# Patient Record
Sex: FEMALE | Race: WHITE | Employment: FULL TIME | URBAN - METROPOLITAN AREA
[De-identification: names, ages, dates, MRNs, and addresses within clinical notes are randomized per-mention and may not be internally consistent; named-entity substitution may affect disease eponyms.]

---

## 2021-04-22 ENCOUNTER — APPOINTMENT (OUTPATIENT)
Dept: CT IMAGING | Age: 41
End: 2021-04-22
Payer: COMMERCIAL

## 2021-04-22 ENCOUNTER — HOSPITAL ENCOUNTER (OUTPATIENT)
Age: 41
Setting detail: OBSERVATION
Discharge: HOME | End: 2021-04-23
Attending: STUDENT IN AN ORGANIZED HEALTH CARE EDUCATION/TRAINING PROGRAM | Admitting: STUDENT IN AN ORGANIZED HEALTH CARE EDUCATION/TRAINING PROGRAM
Payer: COMMERCIAL

## 2021-04-22 DIAGNOSIS — D21.9 FIBROIDS: ICD-10-CM

## 2021-04-22 DIAGNOSIS — R11.2 NON-INTRACTABLE VOMITING WITH NAUSEA, UNSPECIFIED VOMITING TYPE: ICD-10-CM

## 2021-04-22 DIAGNOSIS — K56.600 PARTIAL SMALL BOWEL OBSTRUCTION (HCC): Primary | ICD-10-CM

## 2021-04-22 DIAGNOSIS — R10.30 LOWER ABDOMINAL PAIN: ICD-10-CM

## 2021-04-22 PROBLEM — R10.9 ABDOMINAL PAIN: Status: ACTIVE | Noted: 2021-04-22

## 2021-04-22 LAB
ALBUMIN SERPL-MCNC: 4.1 GM/DL (ref 3.4–5)
ALP BLD-CCNC: 110 IU/L (ref 40–129)
ALT SERPL-CCNC: 29 U/L (ref 10–40)
ANION GAP SERPL CALCULATED.3IONS-SCNC: 8 MMOL/L (ref 4–16)
AST SERPL-CCNC: 18 IU/L (ref 15–37)
BACTERIA: NEGATIVE /HPF
BASOPHILS ABSOLUTE: 0 K/CU MM
BASOPHILS RELATIVE PERCENT: 0.3 % (ref 0–1)
BILIRUB SERPL-MCNC: 0.4 MG/DL (ref 0–1)
BILIRUBIN URINE: NEGATIVE MG/DL
BLOOD, URINE: ABNORMAL
BUN BLDV-MCNC: 18 MG/DL (ref 6–23)
CALCIUM SERPL-MCNC: 8.9 MG/DL (ref 8.3–10.6)
CHLORIDE BLD-SCNC: 103 MMOL/L (ref 99–110)
CLARITY: ABNORMAL
CO2: 24 MMOL/L (ref 21–32)
COLOR: ABNORMAL
CREAT SERPL-MCNC: 0.9 MG/DL (ref 0.6–1.1)
DIFFERENTIAL TYPE: ABNORMAL
EOSINOPHILS ABSOLUTE: 0 K/CU MM
EOSINOPHILS RELATIVE PERCENT: 0.2 % (ref 0–3)
GFR AFRICAN AMERICAN: >60 ML/MIN/1.73M2
GFR NON-AFRICAN AMERICAN: >60 ML/MIN/1.73M2
GLUCOSE BLD-MCNC: 132 MG/DL (ref 70–99)
GLUCOSE, URINE: NEGATIVE MG/DL
HCG QUALITATIVE: NEGATIVE
HCT VFR BLD CALC: 48.5 % (ref 37–47)
HEMOGLOBIN: 15.5 GM/DL (ref 12.5–16)
IMMATURE NEUTROPHIL %: 0.3 % (ref 0–0.43)
KETONES, URINE: ABNORMAL MG/DL
LACTATE: 1.3 MMOL/L (ref 0.4–2)
LEUKOCYTE ESTERASE, URINE: NEGATIVE
LIPASE: 31 IU/L (ref 13–60)
LYMPHOCYTES ABSOLUTE: 1.4 K/CU MM
LYMPHOCYTES RELATIVE PERCENT: 10 % (ref 24–44)
MCH RBC QN AUTO: 26.3 PG (ref 27–31)
MCHC RBC AUTO-ENTMCNC: 32 % (ref 32–36)
MCV RBC AUTO: 82.2 FL (ref 78–100)
MONOCYTES ABSOLUTE: 0.6 K/CU MM
MONOCYTES RELATIVE PERCENT: 4.2 % (ref 0–4)
MUCUS: ABNORMAL HPF
NITRITE URINE, QUANTITATIVE: NEGATIVE
NUCLEATED RBC %: 0 %
PDW BLD-RTO: 14.5 % (ref 11.7–14.9)
PH, URINE: 5 (ref 5–8)
PLATELET # BLD: 251 K/CU MM (ref 140–440)
PMV BLD AUTO: 11.5 FL (ref 7.5–11.1)
POTASSIUM SERPL-SCNC: 4 MMOL/L (ref 3.5–5.1)
PROTEIN UA: 30 MG/DL
RBC # BLD: 5.9 M/CU MM (ref 4.2–5.4)
RBC URINE: 1 /HPF (ref 0–6)
SEGMENTED NEUTROPHILS ABSOLUTE COUNT: 11.9 K/CU MM
SEGMENTED NEUTROPHILS RELATIVE PERCENT: 85 % (ref 36–66)
SODIUM BLD-SCNC: 135 MMOL/L (ref 135–145)
SPECIFIC GRAVITY UA: 1.03 (ref 1–1.03)
SQUAMOUS EPITHELIAL: <1 /HPF
TOTAL IMMATURE NEUTOROPHIL: 0.04 K/CU MM
TOTAL NUCLEATED RBC: 0 K/CU MM
TOTAL PROTEIN: 7.2 GM/DL (ref 6.4–8.2)
TRICHOMONAS: ABNORMAL /HPF
UROBILINOGEN, URINE: NEGATIVE MG/DL (ref 0.2–1)
WBC # BLD: 14 K/CU MM (ref 4–10.5)
WBC UA: ABNORMAL /HPF (ref 0–5)

## 2021-04-22 PROCEDURE — 99285 EMERGENCY DEPT VISIT HI MDM: CPT

## 2021-04-22 PROCEDURE — 96375 TX/PRO/DX INJ NEW DRUG ADDON: CPT

## 2021-04-22 PROCEDURE — 94761 N-INVAS EAR/PLS OXIMETRY MLT: CPT

## 2021-04-22 PROCEDURE — 83690 ASSAY OF LIPASE: CPT

## 2021-04-22 PROCEDURE — 85025 COMPLETE CBC W/AUTO DIFF WBC: CPT

## 2021-04-22 PROCEDURE — 81001 URINALYSIS AUTO W/SCOPE: CPT

## 2021-04-22 PROCEDURE — 80053 COMPREHEN METABOLIC PANEL: CPT

## 2021-04-22 PROCEDURE — 6370000000 HC RX 637 (ALT 250 FOR IP): Performed by: NURSE PRACTITIONER

## 2021-04-22 PROCEDURE — G0378 HOSPITAL OBSERVATION PER HR: HCPCS

## 2021-04-22 PROCEDURE — 6360000002 HC RX W HCPCS: Performed by: PHYSICIAN ASSISTANT

## 2021-04-22 PROCEDURE — 36415 COLL VENOUS BLD VENIPUNCTURE: CPT

## 2021-04-22 PROCEDURE — 74176 CT ABD & PELVIS W/O CONTRAST: CPT

## 2021-04-22 PROCEDURE — 84703 CHORIONIC GONADOTROPIN ASSAY: CPT

## 2021-04-22 PROCEDURE — 96374 THER/PROPH/DIAG INJ IV PUSH: CPT

## 2021-04-22 PROCEDURE — 83605 ASSAY OF LACTIC ACID: CPT

## 2021-04-22 PROCEDURE — 2580000003 HC RX 258: Performed by: PHYSICIAN ASSISTANT

## 2021-04-22 RX ORDER — PANTOPRAZOLE SODIUM 40 MG/10ML
40 INJECTION, POWDER, LYOPHILIZED, FOR SOLUTION INTRAVENOUS DAILY
Status: DISCONTINUED | OUTPATIENT
Start: 2021-04-23 | End: 2021-04-23 | Stop reason: HOSPADM

## 2021-04-22 RX ORDER — SODIUM CHLORIDE 9 MG/ML
25 INJECTION, SOLUTION INTRAVENOUS PRN
Status: DISCONTINUED | OUTPATIENT
Start: 2021-04-22 | End: 2021-04-23 | Stop reason: HOSPADM

## 2021-04-22 RX ORDER — POLYETHYLENE GLYCOL 3350 17 G/17G
17 POWDER, FOR SOLUTION ORAL DAILY PRN
Status: DISCONTINUED | OUTPATIENT
Start: 2021-04-22 | End: 2021-04-23 | Stop reason: HOSPADM

## 2021-04-22 RX ORDER — KETOROLAC TROMETHAMINE 30 MG/ML
30 INJECTION, SOLUTION INTRAMUSCULAR; INTRAVENOUS ONCE
Status: COMPLETED | OUTPATIENT
Start: 2021-04-22 | End: 2021-04-22

## 2021-04-22 RX ORDER — LISINOPRIL 20 MG/1
20 TABLET ORAL DAILY
Status: DISCONTINUED | OUTPATIENT
Start: 2021-04-23 | End: 2021-04-23 | Stop reason: HOSPADM

## 2021-04-22 RX ORDER — ACETAMINOPHEN 325 MG/1
650 TABLET ORAL EVERY 6 HOURS PRN
Status: DISCONTINUED | OUTPATIENT
Start: 2021-04-22 | End: 2021-04-23 | Stop reason: HOSPADM

## 2021-04-22 RX ORDER — ONDANSETRON 2 MG/ML
4 INJECTION INTRAMUSCULAR; INTRAVENOUS EVERY 6 HOURS PRN
Status: DISCONTINUED | OUTPATIENT
Start: 2021-04-22 | End: 2021-04-23 | Stop reason: HOSPADM

## 2021-04-22 RX ORDER — KETOROLAC TROMETHAMINE 30 MG/ML
15 INJECTION, SOLUTION INTRAMUSCULAR; INTRAVENOUS EVERY 6 HOURS PRN
Status: DISCONTINUED | OUTPATIENT
Start: 2021-04-22 | End: 2021-04-23 | Stop reason: HOSPADM

## 2021-04-22 RX ORDER — NORETHINDRONE ACETATE AND ETHINYL ESTRADIOL 1MG-20(21)
1 KIT ORAL DAILY
COMMUNITY

## 2021-04-22 RX ORDER — 0.9 % SODIUM CHLORIDE 0.9 %
1000 INTRAVENOUS SOLUTION INTRAVENOUS ONCE
Status: COMPLETED | OUTPATIENT
Start: 2021-04-22 | End: 2021-04-22

## 2021-04-22 RX ORDER — SODIUM CHLORIDE 0.9 % (FLUSH) 0.9 %
10 SYRINGE (ML) INJECTION PRN
Status: DISCONTINUED | OUTPATIENT
Start: 2021-04-22 | End: 2021-04-23 | Stop reason: HOSPADM

## 2021-04-22 RX ORDER — CITALOPRAM 40 MG/1
40 TABLET ORAL EVERY EVENING
Status: DISCONTINUED | OUTPATIENT
Start: 2021-04-23 | End: 2021-04-23 | Stop reason: HOSPADM

## 2021-04-22 RX ORDER — NORETHINDRONE ACETATE AND ETHINYL ESTRADIOL 1MG-20(21)
1 KIT ORAL DAILY
Status: DISCONTINUED | OUTPATIENT
Start: 2021-04-23 | End: 2021-04-23

## 2021-04-22 RX ORDER — ONDANSETRON 2 MG/ML
4 INJECTION INTRAMUSCULAR; INTRAVENOUS EVERY 30 MIN PRN
Status: DISCONTINUED | OUTPATIENT
Start: 2021-04-22 | End: 2021-04-22

## 2021-04-22 RX ORDER — MORPHINE SULFATE 4 MG/ML
4 INJECTION, SOLUTION INTRAMUSCULAR; INTRAVENOUS EVERY 4 HOURS PRN
Status: DISCONTINUED | OUTPATIENT
Start: 2021-04-22 | End: 2021-04-23 | Stop reason: HOSPADM

## 2021-04-22 RX ORDER — SODIUM CHLORIDE 0.9 % (FLUSH) 0.9 %
5-40 SYRINGE (ML) INJECTION EVERY 12 HOURS SCHEDULED
Status: DISCONTINUED | OUTPATIENT
Start: 2021-04-22 | End: 2021-04-23 | Stop reason: HOSPADM

## 2021-04-22 RX ORDER — PROMETHAZINE HYDROCHLORIDE 25 MG/1
12.5 TABLET ORAL EVERY 6 HOURS PRN
Status: DISCONTINUED | OUTPATIENT
Start: 2021-04-22 | End: 2021-04-23 | Stop reason: HOSPADM

## 2021-04-22 RX ORDER — MORPHINE SULFATE 4 MG/ML
4 INJECTION, SOLUTION INTRAMUSCULAR; INTRAVENOUS EVERY 30 MIN PRN
Status: DISCONTINUED | OUTPATIENT
Start: 2021-04-22 | End: 2021-04-22

## 2021-04-22 RX ORDER — SODIUM CHLORIDE 9 MG/ML
INJECTION, SOLUTION INTRAVENOUS CONTINUOUS
Status: DISCONTINUED | OUTPATIENT
Start: 2021-04-22 | End: 2021-04-23 | Stop reason: HOSPADM

## 2021-04-22 RX ORDER — LISINOPRIL 20 MG/1
20 TABLET ORAL DAILY
Status: ON HOLD | COMMUNITY
End: 2021-04-23 | Stop reason: SDUPTHER

## 2021-04-22 RX ORDER — ACETAMINOPHEN 650 MG/1
650 SUPPOSITORY RECTAL EVERY 6 HOURS PRN
Status: DISCONTINUED | OUTPATIENT
Start: 2021-04-22 | End: 2021-04-23 | Stop reason: HOSPADM

## 2021-04-22 RX ORDER — CITALOPRAM 40 MG/1
40 TABLET ORAL EVERY EVENING
COMMUNITY

## 2021-04-22 RX ADMIN — MORPHINE SULFATE 4 MG: 4 INJECTION, SOLUTION INTRAMUSCULAR; INTRAVENOUS at 14:13

## 2021-04-22 RX ADMIN — SODIUM CHLORIDE 1000 ML: 9 INJECTION, SOLUTION INTRAVENOUS at 14:13

## 2021-04-22 RX ADMIN — CITALOPRAM 40 MG: 40 TABLET, FILM COATED ORAL at 23:48

## 2021-04-22 RX ADMIN — SODIUM CHLORIDE: 9 INJECTION, SOLUTION INTRAVENOUS at 23:04

## 2021-04-22 RX ADMIN — ONDANSETRON 4 MG: 2 INJECTION INTRAMUSCULAR; INTRAVENOUS at 14:13

## 2021-04-22 RX ADMIN — KETOROLAC TROMETHAMINE 30 MG: 30 INJECTION, SOLUTION INTRAMUSCULAR; INTRAVENOUS at 16:04

## 2021-04-22 ASSESSMENT — PAIN SCALES - GENERAL
PAINLEVEL_OUTOF10: 0
PAINLEVEL_OUTOF10: 8

## 2021-04-22 ASSESSMENT — PAIN DESCRIPTION - ORIENTATION: ORIENTATION: LOWER

## 2021-04-22 NOTE — H&P
History and Physical      Name:  Toya Severin /Age/Sex: 1980  (39 y.o. female)   MRN & CSN:  [id number] & [id number] Admission Date/Time: 2021 12:25 PM   Location:  ED16/ED-16 PCP: No primary care provider on file. Discussed patient with Dr. Haroon Hernandez and Plan:     Toya Severin is a 39 y.o.  female  who presents with abdominal pain    - Abdominal pain  CT ab/pel: parastomal hernia with ? Partial SBO  ? +/- 2/2 uterine fibroid pain- scheduled for hysterectomy/hernia @[facility]  GEN SURG @ [facility], Dr Lj Xie, consulted by ED: non-surgical, obs overnight  Hx of ulcerative colitis with LLQ ostomy- ; still with good output  Abdominal pain improved in ED  NPO except sips/meds, IVF, pain control  Serial abdominal exams q 4 hours  Holding abx; lactic pending       Chronic  - Uterine fibroids hx- scheduled for hysterectomy 2021 @ [place]  - Depression- cont celexa  - HTN- cont lisinopril    Discussed patient with ER physician     Diet NPO    DVT Prophylaxis [] Lovenox, []  Heparin, [x] SCDs, [] Ambulation   GI Prophylaxis [x] PPI,  [] H2 Blocker,  [] Carafate,  [] Diet/Tube Feeds   Code Status Full   Disposition Patient requires continued admission due to    MDM [] Low, [x] Moderate,[]  High  Patient's risk as above due to      [] One or more chronic illnesses with exacerbation progression      [x] Two or more stable chronic illnesses      [x] Undiagnosed new problem with uncertain prognosis      [] Elective major surgery      []Prescription drug management       History of Present Illness:     Chief Complaint: abdominal pain    Toya Severin is a 39 y.o.  female  who presents with the above complaints, onset today this morning. Patient has hx of UC with ileostomy . Reports acute onset of abdominal pain today around 930 am. Reports non-bloody emesis x 1 PTA, none since.  Denies nausea since arrival. Still has good ileostomy output, requiring bag to be emptied this afternoon. Denies pain at time of exam.  States she has uterine fibroids and is supposed to get hysterectomy with parastomal hernia repair in June or July this year at [facility]; follows with Dr Duong Benavidez. Denies fever/chills. Ostomy output is moderately thick- reported as baseline for her. Confirms code status. Denies regular alcohol use, illicit drug use. Ten point ROS reviewed negative, unless as noted above    Objective:     No intake or output data in the 24 hours ending 04/22/21 1806     Vitals:   Vitals:    04/22/21 1713   BP: 137/86   Pulse: 79   Resp: 18   Temp: 97.9 °F (36.6 °C)   SpO2: 98%       Physical Exam:     GEN Awake female, sitting upright in bed in no apparent distress. Appears given age. EYES Pupils are equally round. No scleral erythema, discharge, or conjunctivitis. HENT Mucous membranes are moist. Oral pharynx without exudates, no evidence of thrush. NECK Supple, no apparent thyromegaly or masses. RESP Clear to auscultation, no wheezes, rales or rhonchi. Symmetric chest movement while on room air. CARDIO/VASC S1/S2 auscultated. Regular rate without appreciable murmurs, rubs, or gallops. No JVD or carotid bruits. Peripheral pulses equal bilaterally and palpable. No peripheral edema. GI LLQ ostomy with moderately thick output in bag. Abdomen is soft without significant tenderness, masses, or guarding. Bowel sounds are normoactive. Rectal exam deferred.  No costovertebral angle tenderness. Sauer catheter is not present. HEME/LYMPH No palpable cervical lymphadenopathy and no hepatosplenomegaly. No petechiae or ecchymoses. MSK No gross joint deformities. Strength equal in BLE and BUE  SKIN Normal coloration, warm, dry. NEURO Cranial nerves appear grossly intact, normal speech, no lateralizing weakness. PSYCH Awake, alert, oriented x 4. Affect appropriate.     Past Medical History:        Past Medical History:   Diagnosis Date    Fibroids     Hypertension     Ulcerative APRN - NP Reordered   Ordered as: norethindrone-ethinyl estradiol (JUNEL FE 1/20) 1-20 MG-MCG per tablet 1 tablet - 1 tablet, Oral, DAILY, First dose on Fri 4/23/21 at 0900     Data:     CT ABDOMEN PELVIS WO CONTRAST Additional Contrast? None [[id number]] Collected: 04/22/21 1630      Order Status: Completed Updated: 04/22/21 1648     Narrative:       EXAMINATION:   CT OF THE ABDOMEN AND PELVIS WITHOUT CONTRAST 4/22/2021 3:28 pm     TECHNIQUE:   CT of the abdomen and pelvis was performed without the administration of   intravenous contrast. Multiplanar reformatted images are provided for review. Dose modulation, iterative reconstruction, and/or weight based adjustment of   the mA/kV was utilized to reduce the radiation dose to as low as reasonably   achievable. COMPARISON:   None. HISTORY:   ORDERING SYSTEM PROVIDED HISTORY: generalized abdominal pain, NV, history of   UC with colostomy   TECHNOLOGIST PROVIDED HISTORY:   Reason for exam:->generalized abdominal pain, NV, history of UC with colostomy   Reason for exam:->r/o obstruction   Additional Contrast?->None   Reason for Exam: generalized abdominal pain, NV, history of UC with   colostomy; r/o obstruction   Acuity: Acute   Type of Exam: Initial     FINDINGS:   Lower Chest: Lung bases are clear. Organs: Evaluation challenge by lack of IV contrast.  Liver, gallbladder,   spleen, adrenal glands, kidneys, and pancreas unremarkable.  Left adrenal   nodule nonspecific. GI/Bowel: Postsurgical changes identified status post subtotal colectomy.    Left lower quadrant ostomy noted.  There is herniated loop of bowel   identified through the ostomy site with increased congestion of the   mesentery.  There is thickening of the small bowel loops identified slightly   increased attenuation of the mucosa of the small bowel loops are herniated   through the ostomy site. Marinell Curd is there is nonspecific fluid identified   along the site of the hernia.  No pneumatosis. Pelvis: Uterus is enlarged and heterogeneous suggestive multiple fibroids. No suspicious adnexal mass.  Bladder is unremarkable. Peritoneum/Retroperitoneum: Free fluid.  No free air.  Aorta unremarkable   caliber. Bones/Soft Tissues: Degenerate changes.      Impression:       Parastomal hernia with thickened inflamed loops of bowel within the hernia   sac and within the left lower quadrant.  There is a least partial small-bowel   obstruction as a result.  Surgical consultation is recommended given the   degree of mural thickening inflammatory changes involving the bowel loops. Correlation with lactic acid levels recommended.      Enlarged heterogeneous uterus can be seen with on the fibroids.      CT ABDOMEN PELVIS W IV CONTRAST Additional Contrast? None [[id number]] Updated: 04/22/21 1528     Order Status: Canceled          Electronically signed by DOMINIC Collins NP on 4/22/2021 at 6:06 PM

## 2021-04-22 NOTE — ED PROVIDER NOTES
or sensory changes   Endocrine:  Denies polyuria or polydypsia   Lymphatic:  Denies swollen glands     All other review of systems are negative  See HPI and nursing notes for additional information     PAST MEDICAL & SURGICAL HISTORY    Past Medical History:   Diagnosis Date    Fibroids     Hypertension     Ulcerative colitis ([facility] [address]      Past Surgical History:   Procedure Laterality Date    APPENDECTOMY      COLOSTOMY         CURRENT MEDICATIONS        ALLERGIES    No Known Allergies    SOCIAL AND FAMILY HISTORY    Social History     Socioeconomic History    Marital status:      Spouse name: None    Number of children: None    Years of education: None    Highest education level: None   Occupational History    None   Social Needs    Financial resource strain: None    Food insecurity     Worry: None     Inability: None    Transportation needs     Medical: None     Non-medical: None   Tobacco Use    Smoking status: Never Smoker    Smokeless tobacco: Never Used   Substance and Sexual Activity    Alcohol use: Not Currently    Drug use: Never    Sexual activity: None   Lifestyle    Physical activity     Days per week: None     Minutes per session: None    Stress: None   Relationships    Social connections     Talks on phone: None     Gets together: None     Attends Rastafarian service: None     Active member of club or organization: None     Attends meetings of clubs or organizations: None     Relationship status: None    Intimate partner violence     Fear of current or ex partner: None     Emotionally abused: None     Physically abused: None     Forced sexual activity: None   Other Topics Concern    None   Social History Narrative    None     History reviewed. No pertinent family history.     PHYSICAL EXAM    VITAL SIGNS: /86   Pulse 79   Temp 97.9 °F (36.6 °C) (Oral)   Resp 18   Ht 5' 5\" (1.651 m)   Wt 250 lb (113.4 kg)   LMP 04/15/2021   SpO2 98%   BMI 41.60 kg/m²   General: Eosinophils Absolute 0.0 K/CU MM    Basophils Absolute 0.0 K/CU MM    Nucleated RBC % 0.0 %    Total Nucleated RBC 0.0 K/CU MM    Total Immature Neutrophil 0.04 K/CU MM    Immature Neutrophil % 0.3 0 - 0.43 %   CMP   Result Value Ref Range    Sodium 135 135 - 145 MMOL/L    Potassium 4.0 3.5 - 5.1 MMOL/L    Chloride 103 99 - 110 mMol/L    CO2 24 21 - 32 MMOL/L    BUN 18 6 - 23 MG/DL    CREATININE 0.9 0.6 - 1.1 MG/DL    Glucose 132 (H) 70 - 99 MG/DL    Calcium 8.9 8.3 - 10.6 MG/DL    Albumin 4.1 3.4 - 5.0 GM/DL    Total Protein 7.2 6.4 - 8.2 GM/DL    Total Bilirubin 0.4 0.0 - 1.0 MG/DL    ALT 29 10 - 40 U/L    AST 18 15 - 37 IU/L    Alkaline Phosphatase 110 40 - 129 IU/L    GFR Non-African American >60 >60 mL/min/1.73m2    GFR African American >60 >60 mL/min/1.73m2    Anion Gap 8 4 - 16   Lipase   Result Value Ref Range    Lipase 31 13 - 60 IU/L   HCG Serum, Qualitative   Result Value Ref Range    hCG Qual NEGATIVE         RADIOLOGY/PROCEDURES       CT ABDOMEN PELVIS WO CONTRAST Additional Contrast? None (Final result)  Result time 04/22/21 16:45:55  [facility] Additional Contrast? None  Final result by Kole Morris MD (04/22/21 16:45:55)                Impression:    Parastomal hernia with thickened inflamed loops of bowel within the hernia   sac and within the left lower quadrant.  There is a least partial small-bowel   obstruction as a result.  Surgical consultation is recommended given the   degree of mural thickening inflammatory changes involving the bowel loops. Correlation with lactic acid levels recommended. Enlarged heterogeneous uterus can be seen with on the fibroids. Narrative:    EXAMINATION:   CT OF THE ABDOMEN AND PELVIS WITHOUT CONTRAST 4/22/2021 3:28 pm     TECHNIQUE:   CT of the abdomen and pelvis was performed without the administration of   intravenous contrast. Multiplanar reformatted images are provided for review.    Dose modulation, iterative reconstruction, and/or weight based adjustment of   the mA/kV was utilized to reduce the radiation dose to as low as reasonably   achievable. COMPARISON:   None. HISTORY:   ORDERING SYSTEM PROVIDED HISTORY: generalized abdominal pain, NV, history of   UC with colostomy   TECHNOLOGIST PROVIDED HISTORY:   Reason for exam:->generalized abdominal pain, NV, history of UC with colostomy   Reason for exam:->r/o obstruction   Additional Contrast?->None   Reason for Exam: generalized abdominal pain, NV, history of UC with   colostomy; r/o obstruction   Acuity: Acute   Type of Exam: Initial     FINDINGS:   Lower Chest: Lung bases are clear. Organs: Evaluation challenge by lack of IV contrast.  Liver, gallbladder,   spleen, adrenal glands, kidneys, and pancreas unremarkable.  Left adrenal   nodule nonspecific. GI/Bowel: Postsurgical changes identified status post subtotal colectomy. Left lower quadrant ostomy noted.  There is herniated loop of bowel   identified through the ostomy site with increased congestion of the   mesentery.  There is thickening of the small bowel loops identified slightly   increased attenuation of the mucosa of the small bowel loops are herniated   through the ostomy site. Sydelle Piggs is there is nonspecific fluid identified   along the site of the hernia.  No pneumatosis. Pelvis: Uterus is enlarged and heterogeneous suggestive multiple fibroids. No suspicious adnexal mass.  Bladder is unremarkable. Peritoneum/Retroperitoneum: Free fluid.  No free air.  Aorta unremarkable   caliber. Bones/Soft Tissues: Degenerate changes.                         ED COURSE & MEDICAL DECISION MAKING      Vital signs and nursing notes reviewed during ED course. I have independently evaluated this patient . Supervising MD - Dr Chad Zhou - present in the Emergency Department, available for consultation, throughout entirety of  patient care.  All pertinent Lab data and radiographic results reviewed with patient at bedside. The patient and / or the family were informed of the results of any tests, a time was given to answer questions, a plan was proposed and they agreed with plan. Differential diagnosis: Abdominal Aortic Aneurysm, Ischemic Bowel, Bowel Obstruction, Acute Cholecystitis, Acute Appendicitis, other    Clinical  IMPRESSION    1. Partial small bowel obstruction ([address]    2. Lower abdominal pain    3. Non-intractable vomiting with nausea, unspecified vomiting type    4. Fibroids        Patient presents with generalized lower abdominal pain nausea vomiting history of ulcerative colitis/colostomy. On exam, pleasant nontoxic but mildly uncomfortable appearing 59-year-old female, afebrile in no acute respiratory distress. Noted colostomy bag in left lower abdomen with liquidy light brown stool, no black tarry stools or bright red blood. Generalized tenderness across the lower abdomen with hypoactive bowel sounds. No CVA tenderness. Patient is kept n.p.o. on the ED. Start IV fluids, morphine and Zofran. CBC with leukocytosis 14.0 with left shift. Normal hemoglobin. CMP without significant derangement or electrolyte disturbance. Normal lipase. Serum pregnancy is negative. UA is pending. Patient's IV did infiltrate while in CT so imaging was done without contrast.  Does show a parastomal hernia with thickened inflamed loops of bowel within the hernia sac and within the left lower quadrant concerning for at least a partial small bowel obstruction and recommending for surgical consultation given the degree of mural thickening of the inflammatory changes. Also heterogeneous enlarged uterus seen with fibroids. On serial exam, patient is feeling improved after additional IM Toradol. Abdomen continues to be soft mildly tender across the lower abdomen but she is peeling improved with resolution of nausea.   Discussed with patient imaging results and concern for partial compliance with current hospital AGUS/ED protocol, prior to admission I did discuss this patient case with emergency department physician, Dr. Karl Kaur, who did agree with ED workup/evaluation and plan for admission. Of note, this Pt was NOT admitted to the ICU. Comment: Please note this report has been produced using speech recognition software and may contain errors related to that system including errors in grammar, punctuation, and spelling, as well as words and phrases that may be inappropriate. If there are any questions or concerns please feel free to contact the dictating provider for clarification.           Az Banuelos PA-C  04/22/21 5432

## 2021-04-22 NOTE — ED TRIAGE NOTES
Concerns of bowel blockage has an ostomy for Ulcerative Colitis, she states she had abdominal pain and nausea, felt hot and called EMS.

## 2021-04-23 VITALS
RESPIRATION RATE: 17 BRPM | BODY MASS INDEX: 42.53 KG/M2 | TEMPERATURE: 98.2 F | SYSTOLIC BLOOD PRESSURE: 145 MMHG | DIASTOLIC BLOOD PRESSURE: 72 MMHG | HEIGHT: 65 IN | WEIGHT: 255.3 LBS | HEART RATE: 63 BPM | OXYGEN SATURATION: 99 %

## 2021-04-23 LAB
ANION GAP SERPL CALCULATED.3IONS-SCNC: 10 MMOL/L (ref 4–16)
BASOPHILS ABSOLUTE: 0 K/CU MM
BASOPHILS RELATIVE PERCENT: 0.3 % (ref 0–1)
BUN BLDV-MCNC: 25 MG/DL (ref 6–23)
CALCIUM SERPL-MCNC: 8.5 MG/DL (ref 8.3–10.6)
CHLORIDE BLD-SCNC: 105 MMOL/L (ref 99–110)
CO2: 21 MMOL/L (ref 21–32)
CREAT SERPL-MCNC: 0.8 MG/DL (ref 0.6–1.1)
DIFFERENTIAL TYPE: ABNORMAL
EOSINOPHILS ABSOLUTE: 0.1 K/CU MM
EOSINOPHILS RELATIVE PERCENT: 0.6 % (ref 0–3)
GFR AFRICAN AMERICAN: >60 ML/MIN/1.73M2
GFR NON-AFRICAN AMERICAN: >60 ML/MIN/1.73M2
GLUCOSE BLD-MCNC: 103 MG/DL (ref 70–99)
HCT VFR BLD CALC: 40.6 % (ref 37–47)
HEMOGLOBIN: 12.9 GM/DL (ref 12.5–16)
IMMATURE NEUTROPHIL %: 0.3 % (ref 0–0.43)
LYMPHOCYTES ABSOLUTE: 1.8 K/CU MM
LYMPHOCYTES RELATIVE PERCENT: 19.1 % (ref 24–44)
MCH RBC QN AUTO: 26.2 PG (ref 27–31)
MCHC RBC AUTO-ENTMCNC: 31.8 % (ref 32–36)
MCV RBC AUTO: 82.4 FL (ref 78–100)
MONOCYTES ABSOLUTE: 0.7 K/CU MM
MONOCYTES RELATIVE PERCENT: 7 % (ref 0–4)
NUCLEATED RBC %: 0 %
PDW BLD-RTO: 14.8 % (ref 11.7–14.9)
PLATELET # BLD: 209 K/CU MM (ref 140–440)
PMV BLD AUTO: 11.8 FL (ref 7.5–11.1)
POTASSIUM SERPL-SCNC: 4.3 MMOL/L (ref 3.5–5.1)
RBC # BLD: 4.93 M/CU MM (ref 4.2–5.4)
SEGMENTED NEUTROPHILS ABSOLUTE COUNT: 7 K/CU MM
SEGMENTED NEUTROPHILS RELATIVE PERCENT: 72.7 % (ref 36–66)
SODIUM BLD-SCNC: 136 MMOL/L (ref 135–145)
TOTAL IMMATURE NEUTOROPHIL: 0.03 K/CU MM
TOTAL NUCLEATED RBC: 0 K/CU MM
WBC # BLD: 9.6 K/CU MM (ref 4–10.5)

## 2021-04-23 PROCEDURE — C9113 INJ PANTOPRAZOLE SODIUM, VIA: HCPCS | Performed by: NURSE PRACTITIONER

## 2021-04-23 PROCEDURE — 96375 TX/PRO/DX INJ NEW DRUG ADDON: CPT

## 2021-04-23 PROCEDURE — 36415 COLL VENOUS BLD VENIPUNCTURE: CPT

## 2021-04-23 PROCEDURE — 6360000002 HC RX W HCPCS: Performed by: NURSE PRACTITIONER

## 2021-04-23 PROCEDURE — G0378 HOSPITAL OBSERVATION PER HR: HCPCS

## 2021-04-23 PROCEDURE — 85025 COMPLETE CBC W/AUTO DIFF WBC: CPT

## 2021-04-23 PROCEDURE — 80048 BASIC METABOLIC PNL TOTAL CA: CPT

## 2021-04-23 PROCEDURE — 94761 N-INVAS EAR/PLS OXIMETRY MLT: CPT

## 2021-04-23 PROCEDURE — 2580000003 HC RX 258: Performed by: NURSE PRACTITIONER

## 2021-04-23 PROCEDURE — 99222 1ST HOSP IP/OBS MODERATE 55: CPT | Performed by: SURGERY

## 2021-04-23 RX ORDER — LISINOPRIL 10 MG/1
10 TABLET ORAL DAILY
Qty: 1 TABLET | Refills: 0
Start: 2021-04-23

## 2021-04-23 RX ORDER — ONDANSETRON 4 MG/1
4 TABLET, FILM COATED ORAL 3 TIMES DAILY PRN
Qty: 15 TABLET | Refills: 0 | Status: SHIPPED | OUTPATIENT
Start: 2021-04-23

## 2021-04-23 RX ADMIN — SODIUM CHLORIDE, PRESERVATIVE FREE 10 ML: 5 INJECTION INTRAVENOUS at 08:52

## 2021-04-23 RX ADMIN — PANTOPRAZOLE SODIUM 40 MG: 40 INJECTION, POWDER, FOR SOLUTION INTRAVENOUS at 09:10

## 2021-04-23 ASSESSMENT — ENCOUNTER SYMPTOMS
CONSTIPATION: 0
ABDOMINAL PAIN: 1
BACK PAIN: 1
NAUSEA: 1
ALLERGIC/IMMUNOLOGIC NEGATIVE: 1
RESPIRATORY NEGATIVE: 1
EYES NEGATIVE: 1
VOMITING: 1
DIARRHEA: 0
BLOOD IN STOOL: 0

## 2021-04-23 ASSESSMENT — PAIN SCALES - GENERAL
PAINLEVEL_OUTOF10: 0
PAINLEVEL_OUTOF10: 0

## 2021-04-23 NOTE — DISCHARGE SUMMARY
Discharge Summary    Name:  Nii Gongora /Age/Sex: 1980  (39 y.o. female)   MRN & CSN:  [id number] & [id number] Admission Date/Time: 2021 12:25 PM   Attending:  An Martinez MD Discharging Physician: Sacha Saini MD     HPI:     Per H&P:  Chief Complaint: abdominal pain     Nii Gongora is a 39 y.o.  female  who presents with the above complaints, onset today this morning. Patient has hx of UC with ileostomy . Reports acute onset of abdominal pain today around 930 am. Reports non-bloody emesis x 1 PTA, none since. Denies nausea since arrival. Still has good ileostomy output, requiring bag to be emptied this afternoon. Denies pain at time of exam.  States she has uterine fibroids and is supposed to get hysterectomy with parastomal hernia repair in  or July this year at [place]; follows with Dr Jamaal Cedeño. Denies fever/chills. Ostomy output is moderately thick- reported as baseline for her. Confirms code status. Denies regular alcohol use, illicit drug use. Hospital Course:     Yang Lagos is a pleasant 26-year-old who presented to the ED with abdominal pain. Blood pressure was 103/28 upon arrival to the ED. She started vomiting in the ED. CT showed findings suggestive of a partial bowel obstruction. She was admitted. She has a known parastomal hernia. She was seen by surgery while here and it was noted that she is clinically not obstructed and that her abdominal pain is benign. She does have an ileostomy with good output from it and the stoma appeared healthy and viable. Her diet was advanced, and she is tolerating it well, and she was cleared for discharge. She is to follow-up with her surgeon Dr. Jamaal Cedeño at [facility] to have the parastomal hernia fixed-and she has fibroids will have a hysterectomy at the same time with by Dr. Chris Farris. She was discharged today in stable condition.     Problem list    Abdominal pain  -CT with parastomal hernia and possible partial SBO but (PRINIVIL;ZESTRIL) 10 MG tablet  Take 1 tablet by mouth daily             norethindrone-ethinyl estradiol (JUNEL FE 1/20) 1-20 MG-MCG per tablet  Take 1 tablet by mouth daily             ondansetron (ZOFRAN) 4 MG tablet  Take 1 tablet by mouth 3 times daily as needed for Nausea or Vomiting                 Objective Findings at Discharge:   BP (!) 145/72   Pulse 63   Temp 98.2 °F (36.8 °C) (Oral)   Resp 17   Ht 5' 5\" (1.651 m)   Wt 255 lb 4.8 oz (115.8 kg)   LMP 04/15/2021   SpO2 99%   BMI 42.48 kg/m²            PHYSICAL EXAM   GEN Awake female, pleasant, in no acute distress. She is supine in bed. LABS:    CBC:   Lab Results   Component Value Date    WBC 9.6 04/23/2021    HGB 12.9 04/23/2021    HCT 40.6 04/23/2021    MCV 82.4 04/23/2021     04/23/2021     BMP:   Lab Results   Component Value Date     04/23/2021    K 4.3 04/23/2021     04/23/2021    CO2 21 04/23/2021    BUN 25 04/23/2021    CREATININE 0.8 04/23/2021    CALCIUM 8.5 04/23/2021     Hepatic:   Recent Labs     04/22/21  1309   AST 18   ALT 29   BILITOT 0.4   ALKPHOS 110        IMAGING:    CT ABDOMEN PELVIS WO CONTRAST Additional Contrast? None [[id number]] Collected: 04/22/21 1630      Order Status: Completed Updated: 04/22/21 1648     Narrative:       EXAMINATION:   CT OF THE ABDOMEN AND PELVIS WITHOUT CONTRAST 4/22/2021 3:28 pm     TECHNIQUE:   CT of the abdomen and pelvis was performed without the administration of   intravenous contrast. Multiplanar reformatted images are provided for review. Dose modulation, iterative reconstruction, and/or weight based adjustment of   the mA/kV was utilized to reduce the radiation dose to as low as reasonably   achievable. COMPARISON:   None.      HISTORY:   ORDERING SYSTEM PROVIDED HISTORY: generalized abdominal pain, NV, history of   UC with colostomy   TECHNOLOGIST PROVIDED HISTORY:   Reason for exam:->generalized abdominal pain, NV, history of UC with colostomy   Reason for exam:->r/o obstruction   Additional Contrast?->None   Reason for Exam: generalized abdominal pain, NV, history of UC with   colostomy; r/o obstruction   Acuity: Acute   Type of Exam: Initial     FINDINGS:   Lower Chest: Lung bases are clear. Organs: Evaluation challenge by lack of IV contrast.  Liver, gallbladder,   spleen, adrenal glands, kidneys, and pancreas unremarkable. Left adrenal   nodule nonspecific. GI/Bowel: Postsurgical changes identified status post subtotal colectomy. Left lower quadrant ostomy noted. There is herniated loop of bowel   identified through the ostomy site with increased congestion of the   mesentery. There is thickening of the small bowel loops identified slightly   increased attenuation of the mucosa of the small bowel loops are herniated   through the ostomy site. There is there is nonspecific fluid identified   along the site of the hernia. No pneumatosis. Pelvis: Uterus is enlarged and heterogeneous suggestive multiple fibroids. No suspicious adnexal mass. Bladder is unremarkable. Peritoneum/Retroperitoneum: Free fluid. No free air. Aorta unremarkable   caliber. Bones/Soft Tissues: Degenerate changes. Impression:       Parastomal hernia with thickened inflamed loops of bowel within the hernia   sac and within the left lower quadrant. There is a least partial small-bowel   obstruction as a result. Surgical consultation is recommended given the   degree of mural thickening inflammatory changes involving the bowel loops. Correlation with lactic acid levels recommended. Enlarged heterogeneous uterus can be seen with on the fibroids.       CT ABDOMEN PELVIS W IV CONTRAST Additional Contrast? None [[id number]] Updated: 04/22/21 1528     Order Status: Canceled          Discharge Time of 45 minutes    Electronically signed by Kailee Palma MD on 4/23/2021 at 3:56 PM

## 2021-04-23 NOTE — PLAN OF CARE
Problem: Falls - Risk of:  Goal: Will remain free from falls  Description: Will remain free from falls  4/23/2021 1006 by Yenny Salvador  Outcome: Ongoing  4/22/2021 2257 by Dedra Rajan RN  Outcome: Ongoing  Goal: Absence of physical injury  Description: Absence of physical injury  4/23/2021 1006 by Yenny Salvador  Outcome: Ongoing  4/22/2021 2257 by Dedra Rajan RN  Outcome: Ongoing

## 2021-04-23 NOTE — PROGRESS NOTES
Hospitalist Progress Note      Name:  Mary Aquino /Age/Sex: 1980  (39 y.o. female)   MRN & CSN:  [id number] & [id number] Admission Date/Time: 2021 12:25 PM   Location:  [facility]- PCP: No primary care provider on file. Hospital Day: 2      Assessment and Plan:       Abdominal pain  -CT with parastomal hernia and possible partial SBO  -NPO  -IVF - NS at 100  -Pain control - morphine IV  -Lactic acid level is normal      Hx of UC  -had LLQ ostomy in     Uterine fibroids - to have hysterectomy at [facility] in 2021    Depression - Celexa    HTN - lisinopril    Norethindrone-ethinyl estradiol (JUNEL FE 1/20) therapy - will hold due to risk of VTE while inpatient      Subjective:       Doing better today, she stated her last episode of vomiting was at noon yesterday, and her last pain med was at about 4 pm yesterday. She reports she had output from her ostomy this am.         ED triage note:    Concerns of bowel blockage has an ostomy for Ulcerative Colitis, she states she had abdominal pain and nausea, felt hot and called EMS. ER provider note:    Patient does state that she was aware of the parastomal hernia and is actually following with general surgeon, Dr. Patric Joseph, through [facility] as well as [facility] for anticipated complete hysterectomy with parastomal hernia repair this coming 2021. She states that she is feeling significantly improved especially after the Toradol is not sure if her pain is more secondary to her fibroid pain versus \"a bad protein shake. \"  However given her extensive abdominal surgical history, I discussed that I do feel she would benefit from observation admission for serial abdominal exams, pain control and antiemetics however patient states that she would like to be transferred to [facility] in order to be evaluated by her surgeon Dr. Patric Joseph during admission in case she does develop an complete bowel obstruction that warrants surgical management. I called and spoke with [facility] on-call surgeon, Dr. Julio Boyer. Following review of imaging studies and labs, he does not feel that patient will need emergent surgical management at this time but does agree with plan for hospital admission however does caution that Dr. Hayes Poole is not on-call this weekend. This was discussed with patient and since her surgeon will not be available, patient is now requesting and comfortable with being admitted to [address] [place] [address] with possibility for transfer at a later time should she develop an acute surgical abdomen as she still would like to follow with [facility] surgery. Objective: Intake/Output Summary (Last 24 hours) at 4/23/2021 0720  Last data filed at 4/23/2021 0547  Gross per 24 hour   Intake 671.67 ml   Output --   Net 671.67 ml      Vitals:   Vitals:    04/23/21 0233   BP: (!) 115/59   Pulse: 61   Resp: 17   Temp: 98.6 °F (37 °C)   SpO2: 96%     Physical Exam:       Physical Exam  Pulmonary:      Effort: Pulmonary effort is normal.   Neurological:      Mental Status: She is alert. Cranial Nerves: No cranial nerve deficit.        Medications:   Medications:    sodium chloride flush  5-40 mL Intravenous 2 times per day    pantoprazole  40 mg Intravenous Daily    citalopram  40 mg Oral QPM    lisinopril  20 mg Oral Daily    norethindrone-ethinyl estradiol  1 tablet Oral Daily      Infusions:    sodium chloride      sodium chloride 100 mL/hr at 04/22/21 2304     PRN Meds: sodium chloride flush, 10 mL, PRN  sodium chloride, 25 mL, PRN  promethazine, 12.5 mg, Q6H PRN    Or  ondansetron, 4 mg, Q6H PRN  polyethylene glycol, 17 g, Daily PRN  acetaminophen, 650 mg, Q6H PRN    Or  acetaminophen, 650 mg, Q6H PRN  ketorolac, 15 mg, Q6H PRN  morphine, 4 mg, Q4H PRN          Electronically signed by Sanya Bass MD on 4/23/2021 at 7:20 AM

## 2021-04-23 NOTE — CONSULTS
Department of General Surgery   Surgical Service Dr. Dodie Brennan   Consult Note    Date of Consult: 4/23/21    Reason for Consult:  Abdominal pain    Requesting Physician:  Hospitalist    CHIEF COMPLAINT:  As above    History Obtained From:  patient, electronic medical record    HISTORY OF PRESENT ILLNESS:      The patient is a 39 y.o. female who presented to ED with complaints described as:     Location: lower abdominal   Quality: cramping  Severity: denies on 10 pt scale  Duration: Started yesterday while at work  Timing: acute oneset  Context: hx of multiple abdominal surgeries, has had SBO in past  Modifying factors: improved now  Associated signs and symptoms: nausea and emesis    Pt reports that her symptoms did not feel the same as her previous SBO. She states it was more cramping and feeling \"light headed. \"    She states since being admitted to hospital she is feeling better and is asking me about going home. She wants to eat. Denies F/C. Denies SOB/CP. She reportedly has a surgery scheduled with Gyn/Gen Surg at [facility] for her parastomal hernia and for her fibroids. Pt has had a loop ileostomy in the past and now has an end ileostomy.        Past Medical History:    Past Medical History:   Diagnosis Date    Fibroids     Hypertension     Ulcerative colitis ([address]        Past Surgical History:    Past Surgical History:   Procedure Laterality Date    APPENDECTOMY      COLOSTOMY         Current Medications:   Current Facility-Administered Medications   Medication Dose Route Frequency Provider Last Rate Last Admin    sodium chloride flush 0.9 % injection 5-40 mL  5-40 mL Intravenous 2 times per day DOMINIC Hanks - NP   10 mL at 04/23/21 0852    sodium chloride flush 0.9 % injection 10 mL  10 mL Intravenous PRN DOMINIC Hanks - NP        0.9 % sodium chloride infusion  25 mL Intravenous PRN DOMINIC Hanks - LESLEE        promethazine (PHENERGAN) tablet 12.5 mg  12.5 mg Oral Q6H PRN Lindalee Joshi, APRN - NP        Or    ondansetron [facility]) injection 4 mg  4 mg Intravenous Q6H PRN Lindalee Joshi, APRN - NP        polyethylene glycol (GLYCOLAX) packet 17 g  17 g Oral Daily PRN Lindalee Joshi, APRN - NP        acetaminophen (TYLENOL) tablet 650 mg  650 mg Oral Q6H PRN Lindalee Joshi, APRN - NP        Or    acetaminophen (TYLENOL) suppository 650 mg  650 mg Rectal Q6H PRN Lindalee Joshi, APRN - NP        0.9 % sodium chloride infusion   Intravenous Continuous Lindalee Joshi, APRN -  mL/hr at 04/22/21 2304 New Bag at 04/22/21 2304    pantoprazole (PROTONIX) injection 40 mg  40 mg Intravenous Daily Lindalee Joshi, APRN - NP   40 mg at 04/23/21 0910    citalopram (CELEXA) tablet 40 mg  40 mg Oral QPM Lindalee Joshi, APRN - NP   40 mg at 04/22/21 2348    lisinopril (PRINIVIL;ZESTRIL) tablet 20 mg  20 mg Oral Daily Lindalee Joshi, APRN - NP        ketorolac (TORADOL) injection 15 mg  15 mg Intravenous Q6H PRN Lindalee Joshi, APRN - NP        morphine sulfate (PF) injection 4 mg  4 mg Intravenous Q4H PRN Lindalee Joshi, APRN - NP           Allergies:  Patient has no known allergies.     Social History:   Social History     Socioeconomic History    Marital status:      Spouse name: None    Number of children: None    Years of education: None    Highest education level: None   Occupational History    None   Social Needs    Financial resource strain: None    Food insecurity     Worry: None     Inability: None    Transportation needs     Medical: None     Non-medical: None   Tobacco Use    Smoking status: Never Smoker    Smokeless tobacco: Never Used   Substance and Sexual Activity    Alcohol use: Not Currently    Drug use: Never    Sexual activity: None   Lifestyle    Physical activity     Days per week: None     Minutes per session: None    Stress: None   Relationships    Social connections     Talks on phone: None Pulses: Normal pulses. Pulmonary:      Effort: Pulmonary effort is normal.   Abdominal:      General: There is no distension. Palpations: Abdomen is soft. Tenderness: There is no abdominal tenderness. There is no guarding. Hernia: A hernia is present. Comments: Multiple well-healed abdominal incisions, no peritoneal signs, LLQ end ileostomy with air and liquid in collection bag and there is also a parastomal hernia   Musculoskeletal:         General: No swelling. Skin:     General: Skin is warm. Neurological:      General: No focal deficit present. Mental Status: She is alert.    Psychiatric:         Mood and Affect: Mood normal.           DATA:    CBC with Differential:    Lab Results   Component Value Date    WBC 9.6 04/23/2021    RBC 4.93 04/23/2021    HGB 12.9 04/23/2021    HCT 40.6 04/23/2021     04/23/2021    MCV 82.4 04/23/2021    MCH 26.2 04/23/2021    MCHC 31.8 04/23/2021    RDW 14.8 04/23/2021    SEGSPCT 72.7 04/23/2021    LYMPHOPCT 19.1 04/23/2021    MONOPCT 7.0 04/23/2021    BASOPCT 0.3 04/23/2021    MONOSABS 0.7 04/23/2021    LYMPHSABS 1.8 04/23/2021    EOSABS 0.1 04/23/2021    BASOSABS 0.0 04/23/2021    DIFFTYPE AUTOMATED DIFFERENTIAL 04/23/2021     CMP:    Lab Results   Component Value Date     04/23/2021    K 4.3 04/23/2021     04/23/2021    CO2 21 04/23/2021    BUN 25 04/23/2021    CREATININE 0.8 04/23/2021    GFRAA >60 04/23/2021    LABGLOM >60 04/23/2021    GLUCOSE 103 04/23/2021    PROT 7.2 04/22/2021    LABALBU 4.1 04/22/2021    CALCIUM 8.5 04/23/2021    BILITOT 0.4 04/22/2021    ALKPHOS 110 04/22/2021    AST 18 04/22/2021    ALT 29 04/22/2021     BMP:    Lab Results   Component Value Date     04/23/2021    K 4.3 04/23/2021     04/23/2021    CO2 21 04/23/2021    BUN 25 04/23/2021    LABALBU 4.1 04/22/2021    CREATININE 0.8 04/23/2021    CALCIUM 8.5 04/23/2021    GFRAA >60 04/23/2021    LABGLOM >60 04/23/2021    GLUCOSE 103 04/23/2021 U/A:    Lab Results   Component Value Date    COLORU MICHELLE 04/22/2021    PROTEINU 30 04/22/2021    WBCUA NONE SEEN 04/22/2021    RBCUA 1 04/22/2021    MUCUS OCCASIONAL 04/22/2021    TRICHOMONAS NONE SEEN 04/22/2021    BACTERIA NEGATIVE 04/22/2021    CLARITYU HAZY 04/22/2021    SPECGRAV 1.029 04/22/2021    LEUKOCYTESUR NEGATIVE 04/22/2021    UROBILINOGEN NEGATIVE 04/22/2021    BILIRUBINUR NEGATIVE 04/22/2021    BLOODU MODERATE 04/22/2021     LIPASE:    Lab Results   Component Value Date    LIPASE 31 04/22/2021       LA - WNL    CT A/P:  Parastomal hernia with thickened inflamed loops of bowel within the hernia   sac and within the left lower quadrant.  There is a least partial small-bowel   obstruction as a result.  Surgical consultation is recommended given the   degree of mural thickening inflammatory changes involving the bowel loops. Correlation with lactic acid levels recommended.       Enlarged heterogeneous uterus can be seen with on the fibroids. IMPRESSION:        Patient Active Problem List:     Abdominal pain      38 y/o F with lower abdominal cramping and known para stomal hernia    PLAN:    -Reviewed labs and images from this admission. D/w pt. -Reviewed records from [facility] [place].    -Currently the pt is not clinically obstructed and her abdominal exam is benign. There is output from her ileostomy and the stoma appears healthy and viable.     -Start clears and ADAT. If pt tolerates her diet and remains unobstructed then she can be d/c'd from my standpoint.     -No acute surgical intervention is planned at this time. In talking with the pt, she describes the pain as cramping pain and not the typical pain she has had with obstructions in the past.     -F/u with [facility] for planned elective hysterectomy and parastomal hernia repair.     -Call with any questions, concerns, or issues whatsoever.          Bruce Mast MD